# Patient Record
Sex: FEMALE | Race: OTHER | Employment: UNEMPLOYED | URBAN - METROPOLITAN AREA
[De-identification: names, ages, dates, MRNs, and addresses within clinical notes are randomized per-mention and may not be internally consistent; named-entity substitution may affect disease eponyms.]

---

## 2020-05-01 ENCOUNTER — TELEPHONE (OUTPATIENT)
Dept: INTERNAL MEDICINE CLINIC | Facility: CLINIC | Age: 25
End: 2020-05-01

## 2020-05-01 NOTE — TELEPHONE ENCOUNTER
Called back to schedule NP visit, LVM.    ----- Message from Naima Meneses sent at 4/30/2020  4:22 PM EDT -----  Regarding: Dr. Sam Garcias first and last name: N/A  Reason for call: To become a np   Callback required yes/no and why: Yes  Best contact number(s):  [phone]  Details to clarify the request:  Pt stated that she wants to be established as a np.

## 2020-05-27 ENCOUNTER — TELEPHONE (OUTPATIENT)
Dept: INTERNAL MEDICINE CLINIC | Facility: CLINIC | Age: 25
End: 2020-05-27

## 2020-05-27 ENCOUNTER — VIRTUAL VISIT (OUTPATIENT)
Dept: INTERNAL MEDICINE CLINIC | Facility: CLINIC | Age: 25
End: 2020-05-27

## 2020-05-27 VITALS — WEIGHT: 170 LBS | BODY MASS INDEX: 27.32 KG/M2 | HEIGHT: 66 IN

## 2020-05-27 DIAGNOSIS — K92.1 BLOOD IN STOOL: ICD-10-CM

## 2020-05-27 DIAGNOSIS — M25.50 ARTHRALGIA, UNSPECIFIED JOINT: ICD-10-CM

## 2020-05-27 DIAGNOSIS — R19.7 DIARRHEA, UNSPECIFIED TYPE: Primary | ICD-10-CM

## 2020-05-27 DIAGNOSIS — R11.2 NON-INTRACTABLE VOMITING WITH NAUSEA, UNSPECIFIED VOMITING TYPE: ICD-10-CM

## 2020-05-27 DIAGNOSIS — G44.219 EPISODIC TENSION-TYPE HEADACHE, NOT INTRACTABLE: ICD-10-CM

## 2020-05-27 DIAGNOSIS — R10.84 GENERALIZED ABDOMINAL PAIN: ICD-10-CM

## 2020-05-27 DIAGNOSIS — Z83.3 FH: DIABETES MELLITUS: ICD-10-CM

## 2020-05-27 DIAGNOSIS — R53.82 CHRONIC FATIGUE: ICD-10-CM

## 2020-05-27 RX ORDER — ONDANSETRON 8 MG/1
8 TABLET, ORALLY DISINTEGRATING ORAL
Qty: 20 TAB | Refills: 0 | Status: SHIPPED | OUTPATIENT
Start: 2020-05-27

## 2020-05-27 NOTE — TELEPHONE ENCOUNTER
I called the patient and verified them by name and date of birth. I informed the patient that I scheduled them an appointment with the NP in the [facility] that us under Dr. Darrick Pedersen per Dr. Dylan Templeton. The appointment is scheduled for tomorrow at 2:30 (virtual visit). I informed the patient that they will need to call to pre-register today before 5 or either first thing in the morning. The patient understood and did not have any questions at this time. The patient's labs have been faxed to [address] [place] [address] at [facility].

## 2020-05-27 NOTE — PROGRESS NOTES
Shayla Salmon  Identified pt with two pt identifiers(name and ). Chief Complaint   Patient presents with   [address]     Previous PCP - None // Mother side of family has a history of IBS     GI Problem     Digestion // Has issues since puberty in the last year it has gotten worse // Has too many bowel movements and bleeds almost everyday from bottom // Vomitting and Nausea // Threw up blood the other day // In a lot of pain most of the time        Reviewed record In preparation for visit and have obtained necessary documentation. 1. Have you been to the ER, urgent care clinic or hospitalized since your last visit? No     2. Have you seen or consulted any other health care providers outside of the [address] [place] [address] since your last visit? Include any pap smears or colon screening. No    Patient does not have an advance directive. Vitals reviewed with provider. Health Maintenance reviewed:     Health Maintenance Due   Topic    HPV Age 9Y-34Y (1 - 2-dose series)    DTaP/Tdap/Td series (1 - Tdap)    PAP AKA CERVICAL CYTOLOGY           Wt Readings from Last 3 Encounters:   20 170 lb (77.1 kg)        Temp Readings from Last 3 Encounters:   No data found for Temp        BP Readings from Last 3 Encounters:   No data found for BP        Pulse Readings from Last 3 Encounters:   No data found for Pulse        Vitals:    20 1109   Weight: 170 lb (77.1 kg)   Height: 5' 6\" (1.676 m)   PainSc:   2   PainLoc: Abdomen          Learning Assessment:   :       Learning Assessment 2020   PRIMARY LEARNER Patient   PRIMARY LANGUAGE ENGLISH   LEARNER PREFERENCE PRIMARY VIDEOS   ANSWERED BY Patient   RELATIONSHIP SELF        Depression Screening:   :       3 most recent PHQ Screens 2020   Little interest or pleasure in doing things Not at all   Feeling down, depressed, irritable, or hopeless Not at all   Total Score PHQ 2 0        Fall Risk Assessment:   :     No flowsheet data found. Abuse Screening:   :     No flowsheet data found.      ADL Screening:   :       ADL Assessment 5/27/2020   Feeding yourself No Help Needed   Getting from bed to chair No Help Needed   Getting dressed No Help Needed   Bathing or showering No Help Needed   Walk across the room (includes cane/walker) No Help Needed   Using the telphone No Help Needed   Taking your medications No Help Needed   Preparing meals No Help Needed   Managing money (expenses/bills) No Help Needed   Moderately strenuous housework (laundry) No Help Needed   Shopping for personal items (toiletries/medicines) No Help Needed   Shopping for groceries No Help Needed   Driving No Help Needed   Climbing a flight of stairs No Help Needed   Getting to places beyond walking distances No Help Needed

## 2020-05-27 NOTE — PROGRESS NOTES
Barnetta Dancer is a 22 y.o. female who was seen by synchronous (real-time) audio-video technology on 5/27/2020. Consent: Barnetta Dancer, who was seen by synchronous (real-time) audio-video technology, and/or her healthcare decision maker, is aware that this patient-initiated, Telehealth encounter on 5/27/2020 is a billable service, with coverage as determined by her insurance carrier. She is aware that she may receive a bill and has provided verbal consent to proceed: Yes. This visit was completed virtually using doxy. me  Assessment & Plan:   Diagnoses and all orders for this visit:    1. Diarrhea, unspecified type  -     CELIAC ANTIBODY PROFILE; Future  -     METABOLIC PANEL, COMPREHENSIVE; Future  -     REFERRAL TO GASTROENTEROLOGY  Check labs - needs to see GI  Stop mvi and iron for now  2. Chronic fatigue  -     TSH RFX ON ABNORMAL TO FREE T4; Future  -     VITAMIN B12; Future  -     CBC WITH AUTOMATED DIFF; Future  Check labs, consider sleep referral if no etiology  3. Non-intractable vomiting with nausea, unspecified vomiting type  -     REFERRAL TO GASTROENTEROLOGY  -     ondansetron (ZOFRAN ODT) 8 mg disintegrating tablet; Take 1 Tab by mouth every eight (8) hours as needed for Nausea or Vomiting. 4. Generalized abdominal pain  Will see GI  5. Blood in stool  -     REFERRAL TO GASTROENTEROLOGY    6. Episodic tension-type headache, not intractable  Monitor, try neck exercises, warm compress  7. Arthralgia, unspecified joint    8. FH: diabetes mellitus  -     HEMOGLOBIN A1C WITH EAG;  Future                  Subjective:   Barnetta Dancer is a 22 y.o. female who was seen for Establish Care (Previous PCP - None // Mother side of family has a history of IBS ) and GI Problem (Digestion // Has issues since puberty in the last year it has gotten worse // Has too many bowel movements and bleeds almost everyday from bottom // Vomitting and Nausea // Threw up blood the other day // In a lot of pain most of the time )      Just got insurance this year so didn't have PCP. Was under father's  until finished college. Went to [employer] if any issues. Went to [address] [place] [address] - graduated in December - elementary education. Has an interview for . Single with boyfriend. Living in [place] - living alone. Biggest issue is GI issues.  Has had stomach issues sicne she was a child  Had colonoscopy 2016 - was told internal [facility] - Dr. 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 - back up to 170#  Feels nausea frequently, daily - eating doesn't make better or worse    Occasional vomiting   About 2-3 weeks ago noticed blood in vomitus, also few days ago vomited after eating steak  Will wake at night to have BM sometimes - will feel nauseous  No heartburn or indigestion  Nothing helps discomfort    Periods regular but are painful and heavy - hasn't seen gynecologist lately    Has been on 4-5 different ocp - had lots of bleeding for 3 weeks of month  +sexually active  Not trying to get pregnant but no protection    IBS runs in family - but no official diagnosis    No crohn's or UC in family    Takes iron, melatonin, probiotic and mvi but only in last week    No chest pain, sob, dizziness, weakness    Will feel lightheaded at times, walking around, sometimes while standing - better with staying still    Falls asleep very easily while riding in car    Was working at  prior to [email or web address] pandemic    No FH colon CA - some family history unknown    After BM seems to notice that food runs through undigested  Always feels like she hasn't emptying colon easily    Has HA for about a year - about 2 per week, better after lying down and going to sleep, no aura, may get sensitive to light or sound - not always - frontal, back of head - tylenol helps  No vision changes, no weakness, numbness or tingling other than arms falling asleep off and on one at a time- happens while driving - started after she fell on left side     Joint pain worse than usual lately - elbows, neck, knees, back - no heat, redness or swelling but says base of spine feels small protrusion - as long as she can remember    Feeling tired - not wanting to exercise for example  Has been feeling tired since graduated - not sad, down or depressed, has been anxious about new career        Prior to Admission medications    Medication Sig Start Date End Date Taking?  Authorizing Provider   ondansetron (ZOFRAN ODT) 8 mg disintegrating tablet Take 1 Tab by mouth every eight (8) hours as needed for Nausea or Vomiting. 5/27/20  Yes Jayleen Paul MD     No Known Allergies        ROS    Objective:   Vital Signs: (As obtained by patient/caregiver at home)  Visit Vitals  Ht 5' 6\" (1.676 m) Comment: Patient Reported   Wt 170 lb (77.1 kg) Comment: Patient Reported   BMI 27.44 kg/m²        [INSTRUCTIONS:  \"[x]\" Indicates a positive item  \"[]\" Indicates a negative item  -- DELETE ALL ITEMS NOT EXAMINED]    Constitutional: [x] Appears well-developed and well-nourished [x] No apparent distress      [] Abnormal -     Mental status: [x] Alert and awake  [x] Oriented to person/place/time [x] Able to follow commands    [] Abnormal -     Eyes:   EOM    [x]  Normal    [] Abnormal -   Sclera  [x]  Normal    [] Abnormal -          Discharge [x]  None visible   [] Abnormal -     HENT: [x] Normocephalic, atraumatic  [] Abnormal -   [x] Mouth/Throat: Mucous membranes are moist    External Ears [x] Normal  [] Abnormal -    Neck: [x] No visualized mass [] Abnormal -     Pulmonary/Chest: [x] Respiratory effort normal   [x] No visualized signs of difficulty breathing or respiratory distress        [] Abnormal -      Musculoskeletal:   [] Normal gait with no signs of ataxia         [x] Normal range of motion of neck        [] Abnormal -     Neurological:        [x] No Facial Asymmetry (Cranial nerve 7 motor function) (limited exam due to video visit)          [x] No gaze palsy        [] Abnormal -          Skin:        [x] No significant exanthematous lesions or discoloration noted on facial skin         [] Abnormal -            Psychiatric:       [x] Normal Affect [] Abnormal -        [x] No Hallucinations    Other pertinent observable physical exam findings:-        We discussed the expected course, resolution and complications of the diagnosis(es) in detail. Medication risks, benefits, costs, interactions, and alternatives were discussed as indicated. I advised her to contact the office if her condition worsens, changes or fails to improve as anticipated. She expressed understanding with the diagnosis(es) and plan. Lin Mckeon is a 22 y.o. female who was evaluated by a video visit encounter for concerns as above. Patient identification was verified prior to start of the visit. A caregiver was present when appropriate. Due to this being a TeleHealth encounter (During [facility] public [facility] emergency), evaluation of the following organ systems was limited: Vitals/Constitutional/EENT/Resp/CV/GI//MS/Neuro/Skin/Heme-Lymph-Imm.   Pursuant to the emergency declaration under the [facility] waiver authority and the [employer] Act, this Virtual  Visit was conducted, with patient's (and/or legal guardian's) consent, to reduce the patient's risk of exposure to COVID-19 and provide necessary medical care. Services were provided through a video synchronous discussion virtually to substitute for in-person clinic visit. Patient and provider were located at their individual homes.       Brock Stapleton MD

## 2020-06-04 ENCOUNTER — OFFICE VISIT (OUTPATIENT)
Dept: URGENT CARE | Age: 25
End: 2020-06-04

## 2020-06-04 VITALS — RESPIRATION RATE: 14 BRPM | TEMPERATURE: 98.2 F | HEART RATE: 84 BPM | OXYGEN SATURATION: 99 %

## 2020-06-04 DIAGNOSIS — Z20.822 ENCOUNTER FOR LABORATORY TESTING FOR COVID-19 VIRUS: Primary | ICD-10-CM

## 2020-06-07 LAB — SARS-COV-2, NAA: NOT DETECTED

## 2020-06-11 ENCOUNTER — ANESTHESIA (OUTPATIENT)
Dept: ENDOSCOPY | Age: 25
End: 2020-06-11
Payer: MEDICAID

## 2020-06-11 ENCOUNTER — ANESTHESIA EVENT (OUTPATIENT)
Dept: ENDOSCOPY | Age: 25
End: 2020-06-11
Payer: MEDICAID

## 2020-06-11 ENCOUNTER — HOSPITAL ENCOUNTER (OUTPATIENT)
Age: 25
Setting detail: OUTPATIENT SURGERY
Discharge: HOME | End: 2020-06-11
Attending: INTERNAL MEDICINE | Admitting: INTERNAL MEDICINE
Payer: MEDICAID

## 2020-06-11 VITALS
WEIGHT: 170 LBS | SYSTOLIC BLOOD PRESSURE: 108 MMHG | TEMPERATURE: 98 F | BODY MASS INDEX: 27.44 KG/M2 | OXYGEN SATURATION: 100 % | HEART RATE: 94 BPM | RESPIRATION RATE: 18 BRPM | DIASTOLIC BLOOD PRESSURE: 59 MMHG

## 2020-06-11 PROCEDURE — 88305 TISSUE EXAM BY PATHOLOGIST: CPT

## 2020-06-11 PROCEDURE — 76060000031 HC ANESTHESIA FIRST 0.5 HR: Performed by: INTERNAL MEDICINE

## 2020-06-11 PROCEDURE — 74011250636 HC RX REV CODE- 250/636: Performed by: NURSE ANESTHETIST, CERTIFIED REGISTERED

## 2020-06-11 PROCEDURE — 77030021593 HC FCPS BIOP ENDOSC BSC -A: Performed by: INTERNAL MEDICINE

## 2020-06-11 PROCEDURE — 74011250637 HC RX REV CODE- 250/637: Performed by: INTERNAL MEDICINE

## 2020-06-11 PROCEDURE — 74011000250 HC RX REV CODE- 250: Performed by: NURSE ANESTHETIST, CERTIFIED REGISTERED

## 2020-06-11 PROCEDURE — 76040000019: Performed by: INTERNAL MEDICINE

## 2020-06-11 PROCEDURE — 77030019988 HC FCPS ENDOSC DISP BSC -B: Performed by: INTERNAL MEDICINE

## 2020-06-11 RX ORDER — SODIUM CHLORIDE 0.9 % (FLUSH) 0.9 %
5-40 SYRINGE (ML) INJECTION EVERY 8 HOURS
Status: DISCONTINUED | OUTPATIENT
Start: 2020-06-11 | End: 2020-06-12 | Stop reason: HOSPADM

## 2020-06-11 RX ORDER — PROPOFOL 10 MG/ML
INJECTION, EMULSION INTRAVENOUS AS NEEDED
Status: DISCONTINUED | OUTPATIENT
Start: 2020-06-11 | End: 2020-06-11 | Stop reason: HOSPADM

## 2020-06-11 RX ORDER — FLUMAZENIL 0.1 MG/ML
0.2 INJECTION INTRAVENOUS
Status: DISCONTINUED | OUTPATIENT
Start: 2020-06-11 | End: 2020-06-11 | Stop reason: HOSPADM

## 2020-06-11 RX ORDER — SODIUM CHLORIDE 9 MG/ML
50 INJECTION, SOLUTION INTRAVENOUS CONTINUOUS
Status: DISCONTINUED | OUTPATIENT
Start: 2020-06-11 | End: 2020-06-11 | Stop reason: HOSPADM

## 2020-06-11 RX ORDER — HYOSCYAMINE SULFATE 0.12 MG/1
0.12 TABLET SUBLINGUAL
COMMUNITY
End: 2020-06-12

## 2020-06-11 RX ORDER — ATROPINE SULFATE 0.1 MG/ML
0.5 INJECTION INTRAVENOUS
Status: DISCONTINUED | OUTPATIENT
Start: 2020-06-11 | End: 2020-06-12 | Stop reason: HOSPADM

## 2020-06-11 RX ORDER — FENTANYL CITRATE 50 UG/ML
25-200 INJECTION, SOLUTION INTRAMUSCULAR; INTRAVENOUS
Status: DISCONTINUED | OUTPATIENT
Start: 2020-06-11 | End: 2020-06-11 | Stop reason: HOSPADM

## 2020-06-11 RX ORDER — PHENYLEPHRINE HCL IN 0.9% NACL 0.4MG/10ML
SYRINGE (ML) INTRAVENOUS AS NEEDED
Status: DISCONTINUED | OUTPATIENT
Start: 2020-06-11 | End: 2020-06-11 | Stop reason: HOSPADM

## 2020-06-11 RX ORDER — EPINEPHRINE 0.1 MG/ML
1 INJECTION INTRACARDIAC; INTRAVENOUS
Status: DISCONTINUED | OUTPATIENT
Start: 2020-06-11 | End: 2020-06-12 | Stop reason: HOSPADM

## 2020-06-11 RX ORDER — SODIUM CHLORIDE 0.9 % (FLUSH) 0.9 %
5-40 SYRINGE (ML) INJECTION AS NEEDED
Status: DISCONTINUED | OUTPATIENT
Start: 2020-06-11 | End: 2020-06-12 | Stop reason: HOSPADM

## 2020-06-11 RX ORDER — DEXTROMETHORPHAN/PSEUDOEPHED 2.5-7.5/.8
1.2 DROPS ORAL
Status: DISCONTINUED | OUTPATIENT
Start: 2020-06-11 | End: 2020-06-12 | Stop reason: HOSPADM

## 2020-06-11 RX ORDER — SODIUM CHLORIDE 9 MG/ML
INJECTION, SOLUTION INTRAVENOUS
Status: DISCONTINUED | OUTPATIENT
Start: 2020-06-11 | End: 2020-06-11 | Stop reason: HOSPADM

## 2020-06-11 RX ORDER — MIDAZOLAM HYDROCHLORIDE 1 MG/ML
.25-5 INJECTION, SOLUTION INTRAMUSCULAR; INTRAVENOUS
Status: DISCONTINUED | OUTPATIENT
Start: 2020-06-11 | End: 2020-06-11 | Stop reason: HOSPADM

## 2020-06-11 RX ORDER — NALOXONE HYDROCHLORIDE 0.4 MG/ML
0.4 INJECTION, SOLUTION INTRAMUSCULAR; INTRAVENOUS; SUBCUTANEOUS
Status: DISCONTINUED | OUTPATIENT
Start: 2020-06-11 | End: 2020-06-11 | Stop reason: HOSPADM

## 2020-06-11 RX ORDER — DICYCLOMINE HYDROCHLORIDE 10 MG/1
10 CAPSULE ORAL 2 TIMES DAILY
Qty: 60 CAP | Refills: 5 | Status: SHIPPED | OUTPATIENT
Start: 2020-06-11 | End: 2020-07-11

## 2020-06-11 RX ORDER — PANTOPRAZOLE SODIUM 40 MG/1
40 TABLET, DELAYED RELEASE ORAL DAILY
COMMUNITY

## 2020-06-11 RX ORDER — LIDOCAINE HYDROCHLORIDE 20 MG/ML
INJECTION, SOLUTION EPIDURAL; INFILTRATION; INTRACAUDAL; PERINEURAL AS NEEDED
Status: DISCONTINUED | OUTPATIENT
Start: 2020-06-11 | End: 2020-06-11 | Stop reason: HOSPADM

## 2020-06-11 RX ADMIN — PROPOFOL 50 MG: 10 INJECTION, EMULSION INTRAVENOUS at 15:30

## 2020-06-11 RX ADMIN — PROPOFOL 100 MG: 10 INJECTION, EMULSION INTRAVENOUS at 15:23

## 2020-06-11 RX ADMIN — LIDOCAINE HYDROCHLORIDE 40 MG: 20 INJECTION, SOLUTION EPIDURAL; INFILTRATION; INTRACAUDAL; PERINEURAL at 15:23

## 2020-06-11 RX ADMIN — PROPOFOL 50 MG: 10 INJECTION, EMULSION INTRAVENOUS at 15:36

## 2020-06-11 RX ADMIN — PROPOFOL 50 MG: 10 INJECTION, EMULSION INTRAVENOUS at 15:33

## 2020-06-11 RX ADMIN — PROPOFOL 50 MG: 10 INJECTION, EMULSION INTRAVENOUS at 15:24

## 2020-06-11 RX ADMIN — SODIUM CHLORIDE: 900 INJECTION, SOLUTION INTRAVENOUS at 15:15

## 2020-06-11 RX ADMIN — PHENYLEPHRINE HYDROCHLORIDE 80 MCG: 10 INJECTION INTRAVENOUS at 15:39

## 2020-06-11 RX ADMIN — PROPOFOL 50 MG: 10 INJECTION, EMULSION INTRAVENOUS at 15:39

## 2020-06-11 RX ADMIN — PROPOFOL 50 MG: 10 INJECTION, EMULSION INTRAVENOUS at 15:26

## 2020-06-11 RX ADMIN — PROPOFOL 50 MG: 10 INJECTION, EMULSION INTRAVENOUS at 15:28

## 2020-06-11 NOTE — DISCHARGE INSTRUCTIONS
[address]  [address] [facility], [address] [place] [address]    EGD and COLON DISCHARGE INSTRUCTIONS    Severo Fisherman  [id number]  1995    Discomfort:  Redness at IV site- apply warm compress to area; if redness or soreness persist- contact your physician  There may be a slight amount of blood passed from the rectum  Gaseous discomfort- walking, belching will help relieve any discomfort  You may not operate a vehicle for 12 hours  You may not engage in an occupation involving machinery or appliances for rest of today  You may not drink alcoholic beverages for at least 12 hours  Avoid making any critical decisions for at least 24 hour  DIET:  You may resume your regular diet - however -  remember your colon is empty and a heavy meal will produce gas. Avoid these foods:  vegetables, fried / greasy foods, carbonated drinks     ACTIVITY:  You may  resume your normal daily activities it is recommended that you spend the remainder of the day resting -  avoid any strenuous activity. CALL M.D. ANY SIGN OF:   Increasing pain, nausea, vomiting  Abdominal distension (swelling)  New increased bleeding (oral or rectal)  Fever (chills)  Pain in chest area  Bloody discharge from nose or mouth  Shortness of breath      Follow-up Instructions:   Call Dr. Faye Loja for any questions or problems at [phone] and follow up with him in 6 weeks   Awaiting stool tests to be done          ENDOSCOPY FINDINGS:   Your colonoscopy showed small internal hemorrhoids, rest of exam was normal   Your endoscopy showed small hiatal hernia and inflammation from acid reflux ( esophagitis) .   Telephone # [phone]      Signed By: Faye Loja MD     6/11/2020  4:02 PM       DISCHARGE SUMMARY from Nurse    The following personal items collected during your admission are returned to you:   Dental Appliance: Dental Appliances: None  Vision: Visual Aid: Glasses, At bedside  Hearing Aid:    Jewelry:    Clothing: Other Valuables:    Valuables sent to safe:        Learning About Coronavirus (COVID-19)  Coronavirus (COVID-19): Overview  What is coronavirus (COVID-19)? The coronavirus disease (COVID-19) is caused by a virus. It is an illness that was first found in [place], [place], in December 2019. It has since spread worldwide. The virus can cause fever, cough, and trouble breathing. In severe cases, it can cause pneumonia and make it hard to breathe without help. It can cause death. Coronaviruses are a large group of viruses. They cause the common cold. They also cause more serious illnesses like Middle East respiratory syndrome (MERS) and severe acute respiratory syndrome (SARS). COVID-19 is caused by a novel coronavirus. That means it's a new type that has not been seen in people before. This virus spreads person-to-person through droplets from coughing and sneezing. It can also spread when you are close to someone who is infected. And it can spread when you touch something that has the virus on it, such as a doorknob or a tabletop. What can you do to protect yourself from coronavirus (COVID-19)? The best way to protect yourself from getting sick is to:  · Avoid areas where there is an outbreak. · Avoid contact with people who may be infected. · Wash your hands often with soap or alcohol-based hand sanitizers. · Avoid crowds and try to stay at least 6 feet away from other people. · Wash your hands often, especially after you cough or sneeze. Use soap and water, and scrub for at least 20 seconds. If soap and water aren't available, use an alcohol-based hand . · Avoid touching your mouth, nose, and eyes. What can you do to avoid spreading the virus to others? To help avoid spreading the virus to others:  · Cover your mouth with a tissue when you cough or sneeze. Then throw the tissue in the trash. · Use a disinfectant to clean things that you touch often.   · Stay home if you are sick or have been exposed to the virus. Don't go to school, work, or public areas. And don't use public transportation. · If you are sick:  ? Leave your home only if you need to get medical care. But call the doctor's office first so they know you're coming. And wear a face mask, if you have one.  ? If you have a face mask, wear it whenever you're around other people. It can help stop the spread of the virus when you cough or sneeze. ? Clean and disinfect your home every day. Use household  and disinfectant wipes or sprays. Take special care to clean things that you grab with your hands. These include doorknobs, remote controls, phones, and handles on your refrigerator and microwave. And don't forget countertops, tabletops, bathrooms, and computer keyboards. When to call for help  Call 911 anytime you think you may need emergency care. For example, call if:  · You have severe trouble breathing. (You can't talk at all.)  · You have constant chest pain or pressure. · You are severely dizzy or lightheaded. · You are confused or can't think clearly. · Your face and lips have a blue color. · You pass out (lose consciousness) or are very hard to wake up. Call your doctor now if you develop symptoms such as:  · Shortness of breath. · Fever. · Cough. If you need to get care, call ahead to the doctor's office for instructions before you go. Make sure you wear a face mask, if you have one, to prevent exposing other people to the virus. Where can you get the latest information? The following health organizations are tracking and studying this virus. Their websites contain the most up-to-date information. Heidi Fay also learn what to do if you think you may have been exposed to the virus. · U.S. Centers for Disease Control and Prevention (CDC): The CDC provides updated news about the disease and travel advice. The website also tells you how to prevent the spread of infection.  www.cdc.gov  · World Health Organization [facility]): WHO offers information about the virus outbreaks. WHO also has travel advice. www.who.int  Current as of: April 1, 2020               Content Version: 12.4  © 2006-2020 Healthwise, Incorporated. Care instructions adapted under license by your healthcare professional. If you have questions about a medical condition or this instruction, always ask your healthcare professional. [address] any warranty or liability for your use of this information.

## 2020-06-11 NOTE — PROGRESS NOTES

## 2020-06-11 NOTE — ANESTHESIA PREPROCEDURE EVALUATION
Relevant Problems   No relevant active problems       Anesthetic History   No history of anesthetic complications            Review of Systems / Medical History  Patient summary reviewed, nursing notes reviewed and pertinent labs reviewed    Pulmonary            Asthma : well controlled       Neuro/Psych   Within defined limits           Cardiovascular                  Exercise tolerance: >4 METS     GI/Hepatic/Renal                Endo/Other  Within defined limits           Other Findings              Physical Exam    Airway  Mallampati: I  TM Distance: > 6 cm  Neck ROM: normal range of motion   Mouth opening: Normal     Cardiovascular    Rhythm: regular  Rate: normal         Dental  No notable dental hx       Pulmonary  Breath sounds clear to auscultation               Abdominal         Other Findings            Anesthetic Plan    ASA: 2  Anesthesia type: MAC          Induction: Intravenous  Anesthetic plan and risks discussed with: Patient

## 2020-06-11 NOTE — ANESTHESIA POSTPROCEDURE EVALUATION
Post-Anesthesia Evaluation and Assessment    Patient: Bebeto Villegas MRN: [id number]  SSN: [id number]    YOB: 1995  Age: 22 y.o. Sex: female      I have evaluated the patient and they are stable and ready for discharge from the PACU. Cardiovascular Function/Vital Signs  Visit Vitals  /59   Pulse 94   Temp 36.7 °C (98 °F)   Resp 18   Wt 77.1 kg (170 lb)   SpO2 100%   Breastfeeding No   BMI 27.44 kg/m²       Patient is status post MAC anesthesia for Procedure(s):  COLONOSCOPY  ESOPHAGOGASTRODUODENOSCOPY (EGD) :-  ESOPHAGOGASTRODUODENAL (EGD) BIOPSY  COLON BIOPSY. Nausea/Vomiting: None    Postoperative hydration reviewed and adequate. Pain:  Pain Scale 1: FLACC (06/11/20 1550)  Pain Intensity 1: 0 (06/11/20 1550)   Managed    Neurological Status: At baseline    Mental Status, Level of Consciousness: Alert and  oriented to person, place, and time    Pulmonary Status:   O2 Device: Room air (06/11/20 1550)   Adequate oxygenation and airway patent    Complications related to anesthesia: None    Post-anesthesia assessment completed. No concerns    Signed By: Brandon Bolaños MD     June 11, 2020              Procedure(s):  COLONOSCOPY  ESOPHAGOGASTRODUODENOSCOPY (EGD) :-  ESOPHAGOGASTRODUODENAL (EGD) BIOPSY  COLON BIOPSY.     MAC    <BSHSIANPOST>    INITIAL Post-op Vital signs:   Vitals Value Taken Time   /59 6/11/2020  3:50 PM   Temp 36.7 °C (98 °F) 6/11/2020  3:50 PM   Pulse 94 6/11/2020  3:50 PM   Resp 18 6/11/2020  3:50 PM   SpO2 100 % 6/11/2020  3:50 PM

## 2020-06-11 NOTE — PROGRESS NOTES

## 2020-06-11 NOTE — PROCEDURES
[address]  [address] [facility], [address] [place] [address]      Colonoscopy Operative Report    Anne Reyes  [id number]  1995      Procedure Type:   Colonoscopy with biopsy     Indications:    Diarrhea, Hematochezia/melena       Pre-operative Diagnosis: see indication above    Post-operative Diagnosis:  See findings below    :  Shona Younger MD    Surgical Assistant: None    Implants:  None    Referring Provider: Caridad Alberto MD      Sedation:  MAC anesthesia Propofol      Procedure Details:  After informed consent was obtained with all risks and benefits of procedure explained and preoperative exam completed, the patient was taken to the endoscopy suite and placed in the left lateral decubitus position. Upon sequential sedation as per above, a digital rectal exam was performed demonstrating internal hemorrhoids. The Olympus videocolonoscope  was inserted in the rectum and carefully advanced to the cecum, which was identified by the ileocecal valve and appendiceal orifice, terminal ileum. The cecum was identified by the ileocecal valve and appendiceal orifice. The quality of preparation was good. The colonoscope was slowly withdrawn with careful evaluation between folds. Retroflexion in the rectum was completed . Findings:   Rectum: normal  Grade 1 internal hemorrhoids  Sigmoid: normal  Descending Colon: normal  Transverse Colon: normal  Ascending Colon: normal  Cecum: normal  Terminal Ileum: normal    Random colonic biopsies taken      Specimen Removed:  as above    Complications: None. EBL:  None. Impression:    see findings    Recommendations: --Await pathology.    Await also stool tests, not done yet  levsin did not help, to stop   Trial of bentyl 10 mg po bid  F/u in 6 weeks     Signed By: hSona Younger MD     6/11/2020  3:59 PM

## 2020-06-11 NOTE — H&P
The patient is a 22year old female who presents with a complaint of Rectal bleeding. The patient presents due to new symptoms. The rectal bleeding is characterized as a blood mixed in stools, blood streaking of toilet paper and bloody toilet bowl water. The onset of the rectal bleeding has been gradual. The rectal bleeding has been occurring for 2 years (worse over the past couple months). The frequency of bowel movements has been 5 per day. The stools are bright red blood per rectum, loose and not formed, mixed with blood and watery, and not excessive straining (>25 percent of time). The symptoms have been associated with diarrhea, abdominal pain and use of NSAIDs,  while the symptoms have not been associated with melena, weight loss, constipation or rectal pain. Lab results: no recent lab studies. The patient had a colonoscopy 4 year(s) ago. Note for \"Rectal bleeding\": She reports chronically \"sensitive stomach\" with worsening GI issues over the past couple months. She has 3-8 episodes of diarrhea daily with nocturnal bowel movements, tenesmus, and rectal bleeding. She complains of severe fatigue. She also has nausea and vomiting, she had an episode of hematemesis a couple weeks ago. Her abdominal pain is lower, some relief with heating pads. She does have painful heavy periods. Problem List/Past Medical Rupert Mendez; 6/2/2020 1:11 PM)  IBS (irritable bowel syndrome) (564.1  K58.9)      Allergies (Marguerite Khan; 6/2/2020 1:11 PM)  No Known Allergies  [05/27/2020]:  No Known Drug Allergies  [05/27/2020]:    Medication History (Marguerite Mendez; 6/2/2020 1:11 PM)  Liam Bowers [facility] Tablet Disint, Oral as needed) Active. Medications Reconciled     Family History Rupert Khan; 6/2/2020 1:11 PM)  Lung Cancer   Father. Social History Rupert Khan; 6/2/2020 1:11 PM)  Blood Transfusion   No.  Alcohol Use   Occasional alcohol use. Employment status   N/a.   Marital status   Single. Tobacco Use   Never smoker. Diagnostic Studies History Bib Poole; 6/2/2020 1:11 PM)  Colonoscopy  [2016]:    Health Maintenance History Bib Mendez; 6/2/2020 1:11 PM)  Flu Vaccine   none  Pneumovax   none        Review of Systems Bib Poole; 6/2/2020 1:11 PM)  General Not Present- Chronic Fatigue, Poor Appetite, Weight Gain and Weight Loss. Skin Not Present- Itching, Rash and Skin Color Changes. HEENT Not Present- Hearing Loss and Vertigo. Respiratory Not Present- Difficulty Breathing and TB exposure. Cardiovascular Not Present- Chest Pain, Use of Antibiotics before Dental Procedures and Use of Blood Thinners. Gastrointestinal Present- See HPI. Musculoskeletal Not Present- Arthritis, Hip Replacement Surgery and Knee Replacement Surgery. Neurological Not Present- Weakness. Psychiatric Not Present- Depression. Endocrine Not Present- Diabetes and Thyroid Problems. Hematology Not Present- Anemia. Vitals Bib Poole; 6/2/2020 1:12 PM)  6/2/2020 1:11 PM  Weight: 170 lb   Height: 66 in   Body Surface Area: 1.87 m²   Body Mass Index: 27.44 kg/m²                Physical Exam Portillo Mclean AGACN; 6/2/2020 1:45 PM)  The physical exam findings are as follows:  Note: telehealth        Assessment & Plan Portillo Mclean AGACN; 6/2/2020 1:55 PM)  Tenesmus (rectal) (787.99  R19.8)  Rectal bleeding (569.3  K62.5)  Current Plans  COLONOSCOPY, DIAGNOSTIC (61148)  Started Suprep Bowel Prep Kit 17.5-3.13-1.6GM/177ML, 1 (one) kit container Milliliter as directed, 354 Milliliter, 06/02/2020, No Refill. Local Order: Each bottle is 6 oz (177.4 mL). Take as directed by your physician's office.   Nausea and/or vomiting (787.01  R11.2)  Current Plans  ENDOSCOPY, UPPER GI, DIAGNOSTIC (31176)  Hematemesis (578.0  K92.0)  Acid reflux (530.81  K21.9)  Current Plans  Started Protonix 40MG, 1 (one) Tablet DR 30 minutes before breakfast, #30, 30 days starting 06/02/2020, Ref. x3.  Lower abdominal pain (789.09  R10.30)  Current Plans  Due to this being a TeleHealth encounter (During ZUVAV-59 public health emergency), evaluation of the following organ systems was limited: Vitals/Constitutional/EENT/Resp/CV/GI//MS/Neuro/Skin/Heme-Lymph-Imm. Pursuant to the emergency declaration under the [address] [place] [address] and the [employer] and [employer] Act, this Virtual Visit was conducted with patient's (and/or legal guardian's) consent, to reduce the risk of exposure to COVID-19 and provide necessary medical care. Services were provided through a video synchronous discussion virtually to substitute for in-person encounter. Pt Education - How to access health information online: discussed with patient and provided information. Started Levsin/SL 0.125MG, 1 (one) Tablet every eight hours, as needed, #90, 30 days starting 06/02/2020, Ref. x2.  Diarrhea (787.91  R19.7)  Story: Colonoscopy 2016 reportedly internal hemorrhoids. Impression: She reports chronically \"sensitive stomach\" with worsening GI issues over the past couple months. She has 3-8 episodes of diarrhea daily with nocturnal bowel movements, tenesmus, and rectal bleeding. She complains of severe fatigue. She also has nausea and vomiting, she had an episode of hematemesis a couple weeks ago. Her abdominal pain is lower, some relief with heating pads. She does have painful heavy periods. Recommended GYN evaluation as well. Will treat with PPI and Levsin (she reports dizziness with Bentyl in the past). Will further evaluate with labs, stool studies, EGD, and colonoscopy.   Current Plans  CBC, PLATELETS & AUT DIFF  METABOLIC PANEL, COMPREHENSIVE  CELIAC DISEASE, COMP (32519)  SED RATE ERYTHROCYTE (88497)  C-REACTIVE PROTEIN (16356)  Clostridium difficile Toxin A+B, EIA (81025)  ASSAY, ELASTASE, PANCREATIC, FECAL (22598)  Assay Of Calprotectin Fecal (04873)  Culture, Stool (68624)  LEUKOCYTE COUNT, FECAL (19917)  FECES FAT/LIPIDS QUAL (56464)  OVA & PARASITE DIR SMEAR (46603)  CYLCOSPORA SMEAR/STOOL (09638)  Patient is to call me for any questions or concerns. Follow up office visit after procedure  This patient was reviewed and seen in collaboration with Dr. Catalina Dalton. Date of Surgery Update:  Kyle Nielsen was seen and examined. History and physical has been reviewed. The patient has been examined. There have been no significant clinical changes since the completion of the originally dated History and Physical.  The patient was counseled at length about the risks of lelia Covid-19 in the alonzo-operative and post-operative states including the recovery window of their procedure. The patient was made aware that lelia Covid-19 after a surgical procedure may worsen their prognosis for recovering from the virus and lend to a higher morbidity and or mortality risk. The patient was given the options of postponing their procedure. All of the risks, benefits, and alternatives were discussed. The patient does  wish to proceed with the procedure.     Signed By: Catalina Dalton MD     June 11, 2020 3:16 PM

## 2023-05-19 RX ORDER — PANTOPRAZOLE SODIUM 40 MG/1
40 TABLET, DELAYED RELEASE ORAL DAILY
COMMUNITY

## 2023-05-19 RX ORDER — ONDANSETRON 8 MG/1
8 TABLET, ORALLY DISINTEGRATING ORAL EVERY 8 HOURS PRN
COMMUNITY
Start: 2020-05-27

## (undated) DEVICE — TUBING HYDR IRR --

## (undated) DEVICE — FORCEPS BX L240CM JAW DIA2.8MM L CAP W/ NDL MIC MESH TOOTH

## (undated) DEVICE — FCPS RAD JAW 4LC 240CM W/NDL -- BX/20 RADIAL JAW 4